# Patient Record
Sex: MALE | Race: BLACK OR AFRICAN AMERICAN | NOT HISPANIC OR LATINO | Employment: OTHER | URBAN - METROPOLITAN AREA
[De-identification: names, ages, dates, MRNs, and addresses within clinical notes are randomized per-mention and may not be internally consistent; named-entity substitution may affect disease eponyms.]

---

## 2020-01-13 ENCOUNTER — OFFICE VISIT (OUTPATIENT)
Dept: UROLOGY | Facility: CLINIC | Age: 65
End: 2020-01-13
Payer: MEDICAID

## 2020-01-13 VITALS
HEART RATE: 84 BPM | SYSTOLIC BLOOD PRESSURE: 123 MMHG | DIASTOLIC BLOOD PRESSURE: 80 MMHG | HEIGHT: 70 IN | WEIGHT: 185 LBS | BODY MASS INDEX: 26.48 KG/M2 | RESPIRATION RATE: 12 BRPM

## 2020-01-13 DIAGNOSIS — C61 PROSTATE CANCER METASTATIC TO INTRAPELVIC LYMPH NODE: Primary | ICD-10-CM

## 2020-01-13 DIAGNOSIS — C77.5 PROSTATE CANCER METASTATIC TO INTRAPELVIC LYMPH NODE: Primary | ICD-10-CM

## 2020-01-13 LAB
BILIRUB UR QL STRIP: NEGATIVE
GLUCOSE UR QL STRIP: POSITIVE
KETONES UR QL STRIP: NEGATIVE
LEUKOCYTE ESTERASE UR QL STRIP: NEGATIVE
PH, POC UA: 5
POC AMORP, URINE: ABNORMAL
POC BACTI, URINE: ABNORMAL
POC BLOOD, URINE: POSITIVE
POC CASTS, URINE: ABNORMAL
POC CRYST, URINE: ABNORMAL
POC EPITH, URINE: ABNORMAL
POC HCG, URINE: ABNORMAL
POC HYALIN, URINE: ABNORMAL
POC MUCUS, URINE: ABNORMAL
POC NITRATES, URINE: NEGATIVE
POC OTHER, URINE: ABNORMAL
POC RBC, URINE: ABNORMAL HPF
POC WBC, URINE: ABNORMAL HPF
PROT UR QL STRIP: NEGATIVE
PSA, DIAGNOSTIC: < 0.01 NG/ML (ref 0.1–4)
SP GR UR STRIP: 1.02 (ref 1–1.03)
TESTOST SERPL-MCNC: < 8 NG/DL (ref 95–948)
UROBILINOGEN UR STRIP-ACNC: 0.2 (ref 0.3–2.2)

## 2020-01-13 PROCEDURE — 99213 OFFICE O/P EST LOW 20 MIN: CPT | Mod: 25,S$GLB,, | Performed by: SPECIALIST

## 2020-01-13 PROCEDURE — 96402 PR CHEMOTHER HORMON ANTINEOPL SUB-Q/IM: ICD-10-PCS | Mod: S$GLB,,, | Performed by: SPECIALIST

## 2020-01-13 PROCEDURE — 99213 PR OFFICE/OUTPT VISIT, EST, LEVL III, 20-29 MIN: ICD-10-PCS | Mod: 25,S$GLB,, | Performed by: SPECIALIST

## 2020-01-13 PROCEDURE — 96402 CHEMO HORMON ANTINEOPL SQ/IM: CPT | Mod: S$GLB,,, | Performed by: SPECIALIST

## 2020-01-13 RX ORDER — METFORMIN HYDROCHLORIDE 1000 MG/1
TABLET ORAL
COMMUNITY
Start: 2019-10-15

## 2020-01-13 RX ORDER — NAPROXEN SODIUM 220 MG/1
81 TABLET, FILM COATED ORAL DAILY
COMMUNITY

## 2020-01-13 NOTE — PROGRESS NOTES
Subjective:       Patient ID: Pietro Ashley is a 64 y.o. male.    Chief Complaint: Prostate Cancer (3mth RS/ eligard due)      HPI:  64-year-old  man who was diagnosed with high risk prostate cancer [place].  When he presented to me we talked about treatment alternatives.  Given the high-risk nature of his disease knew he was going to require multimodality therapy.    He underwent robotic prostatectomy January of 2019 his almost 1 year other surgery.  He had a lot of pathological features with 2 Lone lymph nodes that were positive.  He was started on Casodex and Lupron therapy.  He is now on Lupron therapy every 3 months.  He has missed his dose from December of 2019.    He is on calcium and vitamin-D supplementations.  He has not had a DEXA scan which we plan to obtain through his primary care physician on the 1 year anniversary of him being on androgen deprivation treatment.    He still has some mild stress incontinence worsened by activity and straining.  He uses his vacuum device for erections for penile rehabilitation.    His urine today shows some microscopic hematuria.  This 1st time with the same blood in his urine.  We will be monitoring that closely.    Past Medical History:   Past Medical History:   Diagnosis Date    Diabetes mellitus     Elevated PSA     Prostate cancer        Past Surgical Historical:   Past Surgical History:   Procedure Laterality Date    KNEE SURGERY      left knee/pins placed    PROSTATE SURGERY      TONSILLECTOMY, ADENOIDECTOMY          Medications:   Medication List with Changes/Refills   Current Medications    ASPIRIN 81 MG CHEW    Take 81 mg by mouth once daily.    LEUPROLIDE, 6 MONTH, (ELIGARD) 45 MG INJECTION    Inject 45 mg into the skin every 6 (six) months.    METFORMIN (GLUCOPHAGE) 1000 MG TABLET            Past Social History:   Social History     Socioeconomic History    Marital status:      Spouse name: Not on  file    Number of children: Not on file    Years of education: Not on file    Highest education level: Not on file   Occupational History    Not on file   Social Needs    Financial resource strain: Not on file    Food insecurity:     Worry: Not on file     Inability: Not on file    Transportation needs:     Medical: Not on file     Non-medical: Not on file   Tobacco Use    Smoking status: Never Smoker    Smokeless tobacco: Never Used   Substance and Sexual Activity    Alcohol use: Yes     Frequency: Monthly or less    Drug use: Never    Sexual activity: Not Currently   Lifestyle    Physical activity:     Days per week: Not on file     Minutes per session: Not on file    Stress: Not on file   Relationships    Social connections:     Talks on phone: Not on file     Gets together: Not on file     Attends Sabian[employer] service: Not on file     Active member of club or organization: Not on file     Attends meetings of clubs or organizations: Not on file     Relationship status: Not on file   Other Topics Concern    Not on file   Social History Narrative    Not on file       Allergies:   Review of patient's allergies indicates:   Allergen Reactions    Glyburide Rash     Pt was on this med for a few days before rash appeared.        Family History: History reviewed. No pertinent family history.     Review of Systems:   systems reviewed and notable for metastatic prostate cancer  All other systems were reviewed Neg except as stated in the HPI    Physical Exam:  AGeneral: A&Ox3. No apparent distress. No deformities.  Neck: No masses. Normal thyroid.  Lungs: normal inspiration. No use of accessory muscles.  Heart: normal pulse. No arrhythmias.  Abdomen: Soft. NT. ND. No masses. No hernias. No hepatosplenomegaly.  Lymphatic: Neck and groin nodes negative.  Skin: The skin is warm and dry. No jaundice.  Neurology: Cranial nerves 2-12 crossly intact, no focal weaknesses, no sensation deficits, no motor  deficits  Ext: No clubbing, cyanosis or edema.  :  Deferred      Assessment/Plan:       64-year-old man with metastatic prostate cancer who is here today for follow-up.    1.  We shall administer 45 mg of Eligard today.  Patient will return to see us in 6 months.  2.  PSA and testosterone have been obtained and sent for today will give patient a call with the results.  3.  Microscopic hematuria has been noted today repeat a urinalysis in 6 months and see if he persist    Problem List Items Addressed This Visit     None      Visit Diagnoses     Prostate cancer metastatic to intrapelvic lymph node    -  Primary    Relevant Orders    POCT Urinalysis (w/Micro Option)    Prostate Specific Antigen, Diagnostic    Testosterone

## 2020-01-14 ENCOUNTER — DOCUMENTATION ONLY (OUTPATIENT)
Dept: UROLOGY | Facility: CLINIC | Age: 65
End: 2020-01-14

## 2020-01-15 ENCOUNTER — TELEPHONE (OUTPATIENT)
Dept: UROLOGY | Facility: CLINIC | Age: 65
End: 2020-01-15

## 2020-01-15 NOTE — TELEPHONE ENCOUNTER
----- Message from Isiah Jansen MD sent at 1/14/2020 10:06 PM CST -----  Please call and inform patient about this result.  His PSA is undetectable.  I think we will just give him a recent depot of Eligard for 6 months.  Plan will be to consider moving to intermittent androgen deprivation treatment.

## 2020-01-15 NOTE — PROGRESS NOTES
Please call and inform patient about this result.  His PSA is undetectable.  I think we will just give him a recent depot of Eligard for 6 months.  Plan will be to consider moving to intermittent androgen deprivation treatment.

## 2020-02-19 ENCOUNTER — TELEPHONE (OUTPATIENT)
Dept: UROLOGY | Facility: CLINIC | Age: 65
End: 2020-02-19

## 2020-02-19 NOTE — TELEPHONE ENCOUNTER
----- Message from Lang Hilton sent at 2/19/2020  1:14 PM CST -----  Contact: Pt  Please call Pietro to discuss a reference for another men's clinic [phone].

## 2020-02-19 NOTE — TELEPHONE ENCOUNTER
Pt wanted to get testosterone inj's, I advised why he should not. He then asked about otc meds to increase testosterone, I again explained why he should not increase his levels. Eventually I understood pt's real complaint is ED. Will let let dionna know sildenafil not working.

## 2020-03-13 ENCOUNTER — OFFICE VISIT (OUTPATIENT)
Dept: UROLOGY | Facility: CLINIC | Age: 65
End: 2020-03-13
Payer: MEDICAID

## 2020-03-13 VITALS
BODY MASS INDEX: 26.48 KG/M2 | WEIGHT: 185 LBS | SYSTOLIC BLOOD PRESSURE: 108 MMHG | RESPIRATION RATE: 19 BRPM | HEIGHT: 70 IN | DIASTOLIC BLOOD PRESSURE: 72 MMHG | HEART RATE: 83 BPM

## 2020-03-13 DIAGNOSIS — N52.9 ERECTILE DYSFUNCTION, UNSPECIFIED ERECTILE DYSFUNCTION TYPE: ICD-10-CM

## 2020-03-13 DIAGNOSIS — C61 PROSTATE CANCER: Primary | ICD-10-CM

## 2020-03-13 PROCEDURE — 99213 OFFICE O/P EST LOW 20 MIN: CPT | Mod: S$GLB,,, | Performed by: NURSE PRACTITIONER

## 2020-03-13 PROCEDURE — 99213 PR OFFICE/OUTPT VISIT, EST, LEVL III, 20-29 MIN: ICD-10-PCS | Mod: S$GLB,,, | Performed by: NURSE PRACTITIONER

## 2020-03-13 NOTE — PROGRESS NOTES
Subjective:       Patient ID: Pietro Ashley is a 65 y.o. male.    Chief Complaint: Erectile Dysfunction (Would like to possibly get the penile implant)      HPI: 65-year-old male, patient Dr. Jansen, presents with a complaint erectile dysfunction.  Patient has history of prostate cancer.  Patient had a radical prostatectomy on 01/28/2019.  Patient had 2 lymph nodes that were positive for metastatic prostate cancer.  Patient was started on Lupron.  Next injection scheduled for July 2020.    Patient complaint erectile dysfunction.  Patient has tried both Viagra and Cialis without relief.  Patient states he has a good energy level.  Patient also states he has a strong sexual desire.  Patient states that the inability to get an erection has caused him a great deal of stress.  Patient expresses interest in alternative treatments.    Patient denies pain or burning urination.  Denies difficulty voiding.  Denies weight loss.  Denies fever.  Denies blood in his urine.  No other urinary complaints.  All the problems appear stable at this time.       Past Medical History:   Past Medical History:   Diagnosis Date    Diabetes mellitus     Elevated PSA     Prostate cancer        Past Surgical Historical:   Past Surgical History:   Procedure Laterality Date    KNEE SURGERY      left knee/pins placed    PROSTATE SURGERY      TONSILLECTOMY, ADENOIDECTOMY          Medications:   Medication List with Changes/Refills   Current Medications    ASPIRIN 81 MG CHEW    Take 81 mg by mouth once daily.    LEUPROLIDE, 6 MONTH, (ELIGARD) 45 MG INJECTION    Inject 45 mg into the skin every 6 (six) months.    METFORMIN (GLUCOPHAGE) 1000 MG TABLET            Past Social History:   Social History     Socioeconomic History    Marital status:      Spouse name: Not on file    Number of children: Not on file    Years of education: Not on file    Highest education level: Not on file   Occupational History    Not on file   Social Needs     Financial resource strain: Not on file    Food insecurity:     Worry: Not on file     Inability: Not on file    Transportation needs:     Medical: Not on file     Non-medical: Not on file   Tobacco Use    Smoking status: Never Smoker    Smokeless tobacco: Never Used   Substance and Sexual Activity    Alcohol use: Yes     Frequency: Monthly or less    Drug use: Never    Sexual activity: Not Currently   Lifestyle    Physical activity:     Days per week: Not on file     Minutes per session: Not on file    Stress: Not on file   Relationships    Social connections:     Talks on phone: Not on file     Gets together: Not on file     Attends [employer] service: Not on file     Active member of club or organization: Not on file     Attends meetings of clubs or organizations: Not on file     Relationship status: Not on file   Other Topics Concern    Not on file   Social History Narrative    Not on file       Allergies:   Review of patient's allergies indicates:   Allergen Reactions    Glyburide Rash     Pt was on this med for a few days before rash appeared.        Family History: History reviewed. No pertinent family history.     Review of Systems:  Review of Systems   Constitutional: Negative for activity change and appetite change.   HENT: Negative for congestion and dental problem.    Respiratory: Negative for chest tightness and shortness of breath.    Cardiovascular: Negative for chest pain.   Gastrointestinal: Negative for abdominal distention and abdominal pain.   Genitourinary: Negative for decreased urine volume, difficulty urinating, discharge, dysuria, enuresis, flank pain, frequency, genital sores, hematuria, penile pain, penile swelling, scrotal swelling, testicular pain and urgency.   Musculoskeletal: Negative for back pain and neck pain.   Neurological: Negative for dizziness.   Hematological: Negative for adenopathy.   Psychiatric/Behavioral: Negative for agitation, behavioral problems and  confusion.       Physical Exam:  Physical Exam   Nursing note and vitals reviewed.  Constitutional: He is oriented to person, place, and time. He appears well-developed and well-nourished.   HENT:   Head: Normocephalic.   Cardiovascular: Normal rate, regular rhythm and normal heart sounds.    Pulmonary/Chest: Effort normal and breath sounds normal.   Abdominal: Soft. Bowel sounds are normal.   Neurological: He is alert and oriented to person, place, and time.   Skin: Skin is warm and dry.         Assessment/Plan:   1.  Erectile dysfunction:  Discussed with the patient penile injections and penile prosthesis.  Patient provided literature on both penile injections and penile prosthesis.  Patient will review that information and notify us if he would like to proceed with either 1 of those options.  l am not going to check the patient's testosterone, regardless if his testosterone is low he is not a candidate for testosterone treatment secondary to his history of prostate cancer.    2.  Prostate cancer:  Patient scheduled for Lupron injection July 2020.  Patient instructed keep that appointment is scheduled.    The patient will notify us if he wants to discuss alternate treatments for erectile dysfunction.  Otherwise patient will keep his appointment in July.  Follow-up as needed.  Problem List Items Addressed This Visit     None      Visit Diagnoses     Prostate cancer    -  Primary    Erectile dysfunction, unspecified erectile dysfunction type

## 2020-03-19 ENCOUNTER — TELEPHONE (OUTPATIENT)
Dept: UROLOGY | Facility: CLINIC | Age: 65
End: 2020-03-19

## 2020-03-19 NOTE — TELEPHONE ENCOUNTER
----- Message from Vicenta Lebron sent at 3/19/2020  9:41 AM CDT -----  Contact: pt   Patient is confused on if he need to get an injection or if he need a RX please call patient to discuss information. [phone]        Thanks   Vicenta Lebron

## 2020-03-19 NOTE — TELEPHONE ENCOUNTER
----- Message from Lisset Hicks sent at 3/19/2020  9:12 AM CDT -----  Contact: pt  Pt is requesting a call back from the nurse in regards to pt stated that the billing Company needs a CPT code to give the pt a price about self injection medicine.pt also needs pump number as well to get a price. Please call back at [phone] .[employer] [phone] Pt stated he will like to try to get the price on both.

## 2020-03-19 NOTE — TELEPHONE ENCOUNTER
Pt would like to try trimix prior to destini IPP sx. I advised I would check with provider as to sending out script or make appt.

## 2020-03-19 NOTE — TELEPHONE ENCOUNTER
Bring him to see me so we can talked about Tri Mix expectations in training him to inject himself.  We set up an appointment for him let me know so we can write a Tri Mix prescription that he can  before coming to the clinic

## 2020-03-30 ENCOUNTER — OFFICE VISIT (OUTPATIENT)
Dept: UROLOGY | Facility: CLINIC | Age: 65
End: 2020-03-30
Payer: MEDICAID

## 2020-03-30 VITALS
HEART RATE: 82 BPM | HEIGHT: 70 IN | RESPIRATION RATE: 19 BRPM | DIASTOLIC BLOOD PRESSURE: 84 MMHG | WEIGHT: 185 LBS | SYSTOLIC BLOOD PRESSURE: 126 MMHG | BODY MASS INDEX: 26.48 KG/M2

## 2020-03-30 DIAGNOSIS — N52.9 ERECTILE DYSFUNCTION, UNSPECIFIED ERECTILE DYSFUNCTION TYPE: Primary | ICD-10-CM

## 2020-03-30 DIAGNOSIS — C77.5 PROSTATE CANCER METASTATIC TO INTRAPELVIC LYMPH NODE: ICD-10-CM

## 2020-03-30 DIAGNOSIS — C61 PROSTATE CANCER METASTATIC TO INTRAPELVIC LYMPH NODE: ICD-10-CM

## 2020-03-30 PROCEDURE — 99213 PR OFFICE/OUTPT VISIT, EST, LEVL III, 20-29 MIN: ICD-10-PCS | Mod: S$GLB,,, | Performed by: SPECIALIST

## 2020-03-30 PROCEDURE — 99213 OFFICE O/P EST LOW 20 MIN: CPT | Mod: S$GLB,,, | Performed by: SPECIALIST

## 2020-03-30 NOTE — PROGRESS NOTES
Subjective:       Patient ID: Pietro Ashley is a 65 y.o. male.    Chief Complaint: Follow-up (Trimix injection)      HPI: 65-year-old male, presents with a complaint erectile dysfunction.  He is here today for Tri Mix injection trial.    Patient has history of prostate cancer.  Patient had a radical prostatectomy on 01/28/2019. Patient had 2 lymph nodes that were positive for metastatic prostate cancer.  Patient was started on Lupron.  Next injection scheduled for July 2020.  Recent PSA from January 2020 was < 0.01 ng/mL (undetectable).  Testosterone was castrate.     Patient complaint erectile dysfunction.  Patient has tried both Viagra and Cialis without relief.  Patient states he has a good energy level.  Patient also states he has a strong sexual desire.  Patient states that the inability to get an erection has caused him a great deal of stress.  We did make some recommendations to proceed with Tri Mix intra corpora injection trials.    Past Medical History:   Past Medical History:   Diagnosis Date    Diabetes mellitus     Elevated PSA     Prostate cancer        Past Surgical Historical:   Past Surgical History:   Procedure Laterality Date    KNEE SURGERY      left knee/pins placed    PROSTATE SURGERY      TONSILLECTOMY, ADENOIDECTOMY          Medications:   Medication List with Changes/Refills   Current Medications    ASPIRIN 81 MG CHEW    Take 81 mg by mouth once daily.    LEUPROLIDE, 6 MONTH, (ELIGARD) 45 MG INJECTION    Inject 45 mg into the skin every 6 (six) months.    METFORMIN (GLUCOPHAGE) 1000 MG TABLET            Past Social History:   Social History     Socioeconomic History    Marital status:      Spouse name: Not on file    Number of children: Not on file    Years of education: Not on file    Highest education level: Not on file   Occupational History    Not on file   Social Needs    Financial resource strain: Not on file    Food insecurity:     Worry: Not on file     Inability:  Not on file    Transportation needs:     Medical: Not on file     Non-medical: Not on file   Tobacco Use    Smoking status: Never Smoker    Smokeless tobacco: Never Used   Substance and Sexual Activity    Alcohol use: Yes     Frequency: Monthly or less    Drug use: Never    Sexual activity: Not Currently   Lifestyle    Physical activity:     Days per week: Not on file     Minutes per session: Not on file    Stress: Not on file   Relationships    Social connections:     Talks on phone: Not on file     Gets together: Not on file     Attends [employer] service: Not on file     Active member of club or organization: Not on file     Attends meetings of clubs or organizations: Not on file     Relationship status: Not on file   Other Topics Concern    Not on file   Social History Narrative    Not on file       Allergies:   Review of patient's allergies indicates:   Allergen Reactions    Glyburide Rash     Pt was on this med for a few days before rash appeared.        Family History: History reviewed. No pertinent family history.     Review of Systems:   systems reviewed and notable for erectile dysfunction as well as prostate cancer metastases to intrapelvic lymph nodes  All other systems were reviewed Neg except as stated in the HPI    Physical Exam:  General: A&Ox3. No apparent distress. No deformities.  Neck: No masses. Normal thyroid.  Lungs: normal inspiration. No use of accessory muscles.  Heart: normal pulse. No arrhythmias.  Abdomen: Soft. NT. ND. No masses. No hernias. No hepatosplenomegaly.  Lymphatic: Neck and groin nodes negative.  Skin: The skin is warm and dry. No jaundice.  Neurology: Cranial nerves 2-12 crossly intact, no focal weaknesses, no sensation deficits, no motor deficits  Ext: No clubbing, cyanosis or edema.  : External genitalia normal. Uncircumcised. No lesions. Meatus normal size and location.  Scrotum is normal.    Patient was late on examination table.  Penile injection was  demonstrated.  We injected 0.1 cc with Tri Mix 01/30/2020 into the left corpora at the base of the penis.  We checked on patient 15 min later and he had a full phallus but not adequate for sexual penetration.    Assessment/Plan:       65-year-old man with prostate cancer status post prostatectomy who was reported to have intrapelvic lymph node disease is currently on Lupron therapy presents for management of erectile dysfunction, with escalation in therapy after he failed oral medications.     1. We would recommend for him to inject 0.25 cc of Tri Mix 01/30/2020 intracorporeally once a week.  Patient was given instructions today on how to administer this medication.  2.  Patient's PSA continues to be undetectable.  His next Eligard is in July of 2020.  At that time we should make a decision if we need to the continue him on continuous Lupron therapy or switch him to intermittent Lupron therapy since his PSA has responded well.  3.  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

## 2020-05-19 ENCOUNTER — TELEPHONE (OUTPATIENT)
Dept: UROLOGY | Facility: CLINIC | Age: 65
End: 2020-05-19

## 2020-05-19 NOTE — TELEPHONE ENCOUNTER
Pt contacted clinic in regards to Rx refill. Pt did not know the name of the pharmacy, nurse advised pt that he would need to call back with the name of the pharmacy for his trimix injection. Pt verbalized understanding. NB

## 2020-05-19 NOTE — TELEPHONE ENCOUNTER
Attempted to contact pt regarding rx refill. No answer, left vm. BJP    ----- Message from Angie Santo sent at 5/19/2020  1:54 PM CDT -----  Contact: pt  Need another rill on med please call back [phone]

## 2020-06-02 ENCOUNTER — TELEPHONE (OUTPATIENT)
Dept: UROLOGY | Facility: CLINIC | Age: 65
End: 2020-06-02

## 2020-07-13 ENCOUNTER — OFFICE VISIT (OUTPATIENT)
Dept: UROLOGY | Facility: CLINIC | Age: 65
End: 2020-07-13
Payer: MEDICAID

## 2020-07-13 VITALS
HEIGHT: 70 IN | WEIGHT: 197 LBS | SYSTOLIC BLOOD PRESSURE: 108 MMHG | RESPIRATION RATE: 18 BRPM | DIASTOLIC BLOOD PRESSURE: 68 MMHG | HEART RATE: 83 BPM | BODY MASS INDEX: 28.2 KG/M2

## 2020-07-13 DIAGNOSIS — C77.5 PROSTATE CANCER METASTATIC TO INTRAPELVIC LYMPH NODE: Primary | ICD-10-CM

## 2020-07-13 DIAGNOSIS — C61 PROSTATE CANCER: ICD-10-CM

## 2020-07-13 DIAGNOSIS — C61 PROSTATE CANCER METASTATIC TO INTRAPELVIC LYMPH NODE: Primary | ICD-10-CM

## 2020-07-13 LAB
PSA, DIAGNOSTIC: <0.014 NG/ML (ref 0–4)
TESTOST SERPL-MCNC: <2.5 NG/DL (ref 193–740)

## 2020-07-13 PROCEDURE — 99213 OFFICE O/P EST LOW 20 MIN: CPT | Mod: S$GLB,,, | Performed by: SPECIALIST

## 2020-07-13 PROCEDURE — 99213 PR OFFICE/OUTPT VISIT, EST, LEVL III, 20-29 MIN: ICD-10-PCS | Mod: S$GLB,,, | Performed by: SPECIALIST

## 2020-07-13 NOTE — PROGRESS NOTES
Subjective:       Patient ID: Pietro Ashley is a 65 y.o. male.    Chief Complaint: 6 mth f/u with Lupron inj.      HPI:  65-year-old  male presented high risk prostate cancer underwent a robotic radical prostatectomy with bilateral pelvic lymph nodes dissection January 28, 2019.  Final pathology was concerning for adverse pathological features.  He also had to lymph nodes that were positive for metastatic disease.  He has been on androgen deprivation treatment.  Initially started on combined treatment now he is just on single Lupron treatment.  His last 6 month Depo Lupron was January of 2020.  That time we obtain a PSA and as well as serum testosterone levels.  PSA was undetectable and T levels were castrate.    He has erectile dysfunction he had tried sildenafil citrate and other PD 5 inhibitors without success.  He is now on intracavernosal injection treatments with Tri Mix in the since to be helping him.  He administers 0.25 cc per session.    He continues to be on calcium and vitamin D supplementation.  He denies any bone pain today.  He denies any constitutional symptoms such as night sweats malaise fatigue and weight loss.    Past Medical History:   Past Medical History:   Diagnosis Date    Diabetes mellitus     ED (erectile dysfunction)     Elevated PSA     Prostate cancer        Past Surgical Historical:   Past Surgical History:   Procedure Laterality Date    KNEE SURGERY      left knee/pins placed    PROSTATE SURGERY      TONSILLECTOMY, ADENOIDECTOMY          Medications:   Medication List with Changes/Refills   Current Medications    ASPIRIN 81 MG CHEW    Take 81 mg by mouth once daily.    LEUPROLIDE, 6 MONTH, (ELIGARD) 45 MG INJECTION    Inject 45 mg into the skin every 6 (six) months.    METFORMIN (GLUCOPHAGE) 1000 MG TABLET            Past Social History:   Social History     Socioeconomic History    Marital status:      Spouse name: Not on file    Number of children: Not  on file    Years of education: Not on file    Highest education level: Not on file   Occupational History    Not on file   Social Needs    Financial resource strain: Not on file    Food insecurity     Worry: Not on file     Inability: Not on file    Transportation needs     Medical: Not on file     Non-medical: Not on file   Tobacco Use    Smoking status: Never Smoker    Smokeless tobacco: Never Used   Substance and Sexual Activity    Alcohol use: Yes     Frequency: Monthly or less    Drug use: Never    Sexual activity: Not Currently   Lifestyle    Physical activity     Days per week: Not on file     Minutes per session: Not on file    Stress: Not on file   Relationships    Social connections     Talks on phone: Not on file     Gets together: Not on file     Attends L[employer] service: Not on file     Active member of club or organization: Not on file     Attends meetings of clubs or organizations: Not on file     Relationship status: Not on file   Other Topics Concern    Not on file   Social History Narrative    Not on file       Allergies:   Review of patient's allergies indicates:   Allergen Reactions    Glyburide Rash     Pt was on this med for a few days before rash appeared.        Family History: History reviewed. No pertinent family history.     Review of Systems:   systems reviewed and notable for lymph no metastatic prostate cancer  All other systems were reviewed Neg except as stated in the HPI    Physical Exam:  General: A&Ox3. No apparent distress. No deformities.  Neck: No masses. Normal thyroid.  Lungs: normal inspiration. No use of accessory muscles.  Heart: normal pulse. No arrhythmias.  Abdomen: Soft. NT. ND. No masses. No hernias. No hepatosplenomegaly.  Lymphatic: Neck and groin nodes negative.  Skin: The skin is warm and dry. No jaundice.  Neurology: Cranial nerves 2-12 crossly intact, no focal weaknesses, no sensation deficits, no motor deficits  Ext: No clubbing, cyanosis or  edema.  :  Deferred      Assessment/Plan:       65-year-old man with lymph node metastatic prostate cancer as well as erectile dysfunction was here today for follow-up.    1.  The plan will be to transition him into intermittent ADT.  ADT therapy has some systemic side effects.  And since his PSA in January of 2020 was undetectable and testosterone was castrate I am going to repeat a PSA and testosterone today and make a determination patient should be kept on the Lupron treatment.  2.  Because with switching him to potentially intermittent ADT treatments when changes appointments from 6 months to 3 months  3.  Continue using Tri Mix penile injections for erectile dysfunction.    Problem List Items Addressed This Visit     None      Visit Diagnoses     Prostate cancer metastatic to intrapelvic lymph node    -  Primary    Relevant Orders    Testosterone    Prostate cancer        Relevant Orders    Prostate Specific Antigen, Diagnostic

## 2020-07-14 ENCOUNTER — TELEPHONE (OUTPATIENT)
Dept: UROLOGY | Facility: CLINIC | Age: 65
End: 2020-07-14

## 2020-07-14 NOTE — TELEPHONE ENCOUNTER
Pt contacted and informed of PSA results. Pt verbalized understanding.     ----- Message from Isiah Jansen MD sent at 7/14/2020  3:23 PM CDT -----  His PSA continues to be undetectable.  Call and inform him of the results.  We shall maintain him on intermittent androgen deprivation treatments

## 2021-03-12 ENCOUNTER — OFFICE VISIT (OUTPATIENT)
Dept: UROLOGY | Facility: CLINIC | Age: 66
End: 2021-03-12
Payer: MEDICARE

## 2021-03-12 VITALS
SYSTOLIC BLOOD PRESSURE: 129 MMHG | DIASTOLIC BLOOD PRESSURE: 85 MMHG | BODY MASS INDEX: 26.83 KG/M2 | WEIGHT: 187 LBS | HEART RATE: 86 BPM

## 2021-03-12 DIAGNOSIS — N52.9 ERECTILE DYSFUNCTION, UNSPECIFIED ERECTILE DYSFUNCTION TYPE: ICD-10-CM

## 2021-03-12 DIAGNOSIS — C77.5 PROSTATE CANCER METASTATIC TO INTRAPELVIC LYMPH NODE: Primary | ICD-10-CM

## 2021-03-12 DIAGNOSIS — C61 PROSTATE CANCER METASTATIC TO INTRAPELVIC LYMPH NODE: Primary | ICD-10-CM

## 2021-03-12 LAB
PSA, DIAGNOSTIC: <0.014 NG/ML (ref 0–4)
TESTOST SERPL-MCNC: 36.2 NG/DL (ref 193–740)

## 2021-03-12 PROCEDURE — 3008F PR BODY MASS INDEX (BMI) DOCUMENTED: ICD-10-PCS | Mod: CPTII,S$GLB,, | Performed by: SPECIALIST

## 2021-03-12 PROCEDURE — 99213 OFFICE O/P EST LOW 20 MIN: CPT | Mod: S$GLB,,, | Performed by: SPECIALIST

## 2021-03-12 PROCEDURE — 3008F BODY MASS INDEX DOCD: CPT | Mod: CPTII,S$GLB,, | Performed by: SPECIALIST

## 2021-03-12 PROCEDURE — 1126F AMNT PAIN NOTED NONE PRSNT: CPT | Mod: S$GLB,,, | Performed by: SPECIALIST

## 2021-03-12 PROCEDURE — 1126F PR PAIN SEVERITY QUANTIFIED, NO PAIN PRESENT: ICD-10-PCS | Mod: S$GLB,,, | Performed by: SPECIALIST

## 2021-03-12 PROCEDURE — 99213 PR OFFICE/OUTPT VISIT, EST, LEVL III, 20-29 MIN: ICD-10-PCS | Mod: S$GLB,,, | Performed by: SPECIALIST

## 2021-03-12 RX ORDER — EMPAGLIFLOZIN 10 MG/1
10 TABLET, FILM COATED ORAL DAILY
COMMUNITY
Start: 2021-03-10

## 2021-03-15 ENCOUNTER — TELEPHONE (OUTPATIENT)
Dept: UROLOGY | Facility: CLINIC | Age: 66
End: 2021-03-15

## 2021-03-22 ENCOUNTER — TELEPHONE (OUTPATIENT)
Dept: UROLOGY | Facility: CLINIC | Age: 66
End: 2021-03-22

## 2021-03-23 ENCOUNTER — TELEPHONE (OUTPATIENT)
Dept: UROLOGY | Facility: CLINIC | Age: 66
End: 2021-03-23

## 2021-03-26 ENCOUNTER — TELEPHONE (OUTPATIENT)
Dept: UROLOGY | Facility: CLINIC | Age: 66
End: 2021-03-26

## 2021-03-28 DIAGNOSIS — N52.31 ERECTILE DYSFUNCTION AFTER RADICAL PROSTATECTOMY: Primary | ICD-10-CM

## 2021-03-30 ENCOUNTER — CLINICAL SUPPORT (OUTPATIENT)
Dept: UROLOGY | Facility: CLINIC | Age: 66
End: 2021-03-30
Payer: MEDICARE

## 2021-03-30 ENCOUNTER — TELEPHONE (OUTPATIENT)
Dept: UROLOGY | Facility: CLINIC | Age: 66
End: 2021-03-30

## 2021-03-30 DIAGNOSIS — N52.31 ERECTILE DYSFUNCTION AFTER RADICAL PROSTATECTOMY: Primary | ICD-10-CM

## 2021-03-30 RX ORDER — CALCIUM CITRATE/VITAMIN D3 200MG-6.25
TABLET ORAL
COMMUNITY
Start: 2021-03-11

## 2021-03-30 RX ORDER — DIPHENHYDRAMINE HCL 25 MG
TABLET ORAL
COMMUNITY
Start: 2021-03-11

## 2021-03-30 RX ORDER — LANCETS 33 GAUGE
EACH MISCELLANEOUS
COMMUNITY
Start: 2021-03-11

## 2021-04-01 ENCOUNTER — TELEPHONE (OUTPATIENT)
Dept: UROLOGY | Facility: CLINIC | Age: 66
End: 2021-04-01

## 2021-04-05 ENCOUNTER — TELEPHONE (OUTPATIENT)
Dept: UROLOGY | Facility: CLINIC | Age: 66
End: 2021-04-05

## 2021-04-05 LAB
ALBUMIN SERPL BCP-MCNC: 4.4 G/DL (ref 3.4–5)
ALBUMIN/GLOBULIN RATIO: 1.26 RATIO (ref 1.1–1.8)
ALP SERPL-CCNC: 111 U/L (ref 46–116)
ALT SERPL W P-5'-P-CCNC: 30 U/L (ref 12–78)
ANION GAP SERPL CALC-SCNC: 10 MMOL/L (ref 3–11)
AST SERPL-CCNC: 17 U/L (ref 15–37)
ATYPICAL LYMPHOCYTES: 2 % (ref 0–0)
BANDS: 2 % (ref 0–5)
BILIRUB SERPL-MCNC: 0.4 MG/DL (ref 0–1)
BUN SERPL-MCNC: 24 MG/DL (ref 7–18)
BUN/CREAT SERPL: 21.23 RATIO (ref 7–18)
CALCIUM BLD-MCNC: POSITIVE MG/DL
CALCIUM SERPL-MCNC: 9.2 MG/DL (ref 8.8–10.5)
CELLS COUNTED: 100
CHLORIDE SERPL-SCNC: 104 MMOL/L (ref 100–108)
CO2 SERPL-SCNC: 27 MMOL/L (ref 21–32)
COVID-19 AB, IGM: NEGATIVE
CREAT SERPL-MCNC: 1.13 MG/DL (ref 0.7–1.3)
EOSINOPHIL NFR BLD: 3 % (ref 1–3)
ERYTHROCYTE [DISTWIDTH] IN BLOOD BY AUTOMATED COUNT: 12.4 % (ref 12.5–18)
GFR ESTIMATION: > 60
GLOBULIN: 3.5 G/DL (ref 2.3–3.5)
GLUCOSE SERPL-MCNC: 177 MG/DL (ref 70–110)
HCT VFR BLD AUTO: 44.8 % (ref 42–52)
HGB BLD-MCNC: 15.2 G/DL (ref 14–18)
LYMPHOCYTES NFR BLD: 27 % (ref 25–40)
MCH RBC QN AUTO: 32.9 PG (ref 27–31.2)
MCHC RBC AUTO-ENTMCNC: 33.9 G/DL (ref 31.8–35.4)
MCV RBC AUTO: 97 FL (ref 80–97)
MONOCYTES NFR BLD: 5 % (ref 1–15)
NEUTROPHILS # BLD AUTO: 3.7 10*3/UL (ref 1.8–7.7)
NEUTROPHILS NFR BLD: 61 % (ref 37–80)
NUCLEATED RED BLOOD CELLS: 0 %
PLATELETS: 251 10*3/UL (ref 142–424)
POTASSIUM SERPL-SCNC: 4.6 MMOL/L (ref 3.6–5.2)
PROT SERPL-MCNC: 7.9 G/DL (ref 6.4–8.2)
RBC # BLD AUTO: 4.62 10*6/UL (ref 4.7–6.1)
RBC MORPH BLD: ABNORMAL
SMALL PLATELETS BLD QL SMEAR: ADEQUATE
SODIUM BLD-SCNC: 141 MMOL/L (ref 135–145)
WBC # BLD: 5.8 10*3/UL (ref 4.6–10.2)

## 2021-04-07 ENCOUNTER — OUTSIDE PLACE OF SERVICE (OUTPATIENT)
Dept: UROLOGY | Facility: CLINIC | Age: 66
End: 2021-04-07

## 2021-04-07 LAB
GLUCOSE SERPL-MCNC: 139 MG/DL (ref 70–105)
GLUCOSE SERPL-MCNC: 163 MG/DL (ref 70–105)
GLUCOSE SERPL-MCNC: 249 MG/DL (ref 70–105)

## 2021-04-07 PROCEDURE — 54405 PR INSERT,INFLATABLE PENILE PROSTHESIS: ICD-10-PCS | Mod: ,,, | Performed by: SPECIALIST

## 2021-04-07 PROCEDURE — 54405 INSERT MULTI-COMP PENIS PROS: CPT | Mod: ,,, | Performed by: SPECIALIST

## 2021-04-08 LAB
ANION GAP SERPL CALC-SCNC: 5 MMOL/L (ref 3–11)
BUN SERPL-MCNC: 16 MG/DL (ref 7–18)
BUN/CREAT SERPL: 16.49 RATIO (ref 7–18)
CALCIUM SERPL-MCNC: 8.2 MG/DL (ref 8.8–10.5)
CHLORIDE SERPL-SCNC: 103 MMOL/L (ref 100–108)
CO2 SERPL-SCNC: 26 MMOL/L (ref 21–32)
CREAT SERPL-MCNC: 0.97 MG/DL (ref 0.7–1.3)
ERYTHROCYTE [DISTWIDTH] IN BLOOD BY AUTOMATED COUNT: 12 % (ref 12.5–18)
GFR ESTIMATION: > 60
GLUCOSE SERPL-MCNC: 159 MG/DL (ref 70–110)
HCT VFR BLD AUTO: 36.3 % (ref 42–52)
HGB BLD-MCNC: 12.3 G/DL (ref 14–18)
MCH RBC QN AUTO: 33 PG (ref 27–31.2)
MCHC RBC AUTO-ENTMCNC: 33.9 G/DL (ref 31.8–35.4)
MCV RBC AUTO: 97.3 FL (ref 80–97)
NUCLEATED RED BLOOD CELLS: 0 %
PLATELETS: 241 10*3/UL (ref 142–424)
POTASSIUM SERPL-SCNC: 4.3 MMOL/L (ref 3.6–5.2)
RBC # BLD AUTO: 3.73 10*6/UL (ref 4.7–6.1)
SODIUM BLD-SCNC: 134 MMOL/L (ref 135–145)
WBC # BLD: 7.2 10*3/UL (ref 4.6–10.2)

## 2021-04-12 ENCOUNTER — OFFICE VISIT (OUTPATIENT)
Dept: UROLOGY | Facility: CLINIC | Age: 66
End: 2021-04-12
Payer: MEDICARE

## 2021-04-12 VITALS — HEART RATE: 95 BPM | SYSTOLIC BLOOD PRESSURE: 114 MMHG | DIASTOLIC BLOOD PRESSURE: 79 MMHG

## 2021-04-12 DIAGNOSIS — Z51.89 VISIT FOR WOUND CHECK: Primary | ICD-10-CM

## 2021-04-12 PROCEDURE — 99499 NO LOS: ICD-10-PCS | Mod: S$GLB,,, | Performed by: SPECIALIST

## 2021-04-12 PROCEDURE — 1126F PR PAIN SEVERITY QUANTIFIED, NO PAIN PRESENT: ICD-10-PCS | Mod: S$GLB,,, | Performed by: SPECIALIST

## 2021-04-12 PROCEDURE — 1126F AMNT PAIN NOTED NONE PRSNT: CPT | Mod: S$GLB,,, | Performed by: SPECIALIST

## 2021-04-12 PROCEDURE — 99499 UNLISTED E&M SERVICE: CPT | Mod: S$GLB,,, | Performed by: SPECIALIST

## 2021-04-29 ENCOUNTER — OFFICE VISIT (OUTPATIENT)
Dept: UROLOGY | Facility: CLINIC | Age: 66
End: 2021-04-29
Payer: MEDICARE

## 2021-04-29 VITALS
SYSTOLIC BLOOD PRESSURE: 135 MMHG | HEIGHT: 70 IN | DIASTOLIC BLOOD PRESSURE: 76 MMHG | BODY MASS INDEX: 26.77 KG/M2 | WEIGHT: 187 LBS | HEART RATE: 92 BPM

## 2021-04-29 DIAGNOSIS — Z51.89 VISIT FOR WOUND CHECK: Primary | ICD-10-CM

## 2021-04-29 PROCEDURE — 3008F PR BODY MASS INDEX (BMI) DOCUMENTED: ICD-10-PCS | Mod: CPTII,S$GLB,, | Performed by: SPECIALIST

## 2021-04-29 PROCEDURE — 3008F BODY MASS INDEX DOCD: CPT | Mod: CPTII,S$GLB,, | Performed by: SPECIALIST

## 2021-04-29 PROCEDURE — 99499 UNLISTED E&M SERVICE: CPT | Mod: S$GLB,,, | Performed by: SPECIALIST

## 2021-04-29 PROCEDURE — 99499 NO LOS: ICD-10-PCS | Mod: S$GLB,,, | Performed by: SPECIALIST

## 2021-04-29 RX ORDER — CEPHALEXIN 500 MG/1
500 CAPSULE ORAL EVERY 12 HOURS
Qty: 28 CAPSULE | Refills: 0 | Status: SHIPPED | OUTPATIENT
Start: 2021-04-29

## 2021-04-29 RX ORDER — ROSUVASTATIN CALCIUM 20 MG/1
20 TABLET, COATED ORAL DAILY
COMMUNITY
Start: 2021-04-12

## 2021-04-29 RX ORDER — LISINOPRIL 5 MG/1
2.5 TABLET ORAL DAILY
COMMUNITY
Start: 2021-04-12

## 2021-04-29 RX ORDER — LEVOFLOXACIN 500 MG/1
500 TABLET, FILM COATED ORAL DAILY
Qty: 21 TABLET | Refills: 0 | Status: SHIPPED | OUTPATIENT
Start: 2021-04-29

## 2021-05-27 ENCOUNTER — OFFICE VISIT (OUTPATIENT)
Dept: UROLOGY | Facility: CLINIC | Age: 66
End: 2021-05-27
Payer: MEDICARE

## 2021-05-27 VITALS
SYSTOLIC BLOOD PRESSURE: 108 MMHG | BODY MASS INDEX: 26.77 KG/M2 | HEIGHT: 70 IN | HEART RATE: 100 BPM | DIASTOLIC BLOOD PRESSURE: 71 MMHG | WEIGHT: 187 LBS

## 2021-05-27 DIAGNOSIS — Z51.89 VISIT FOR WOUND CHECK: Primary | ICD-10-CM

## 2021-05-27 PROCEDURE — 99024 PR POST-OP FOLLOW-UP VISIT: ICD-10-PCS | Mod: S$GLB,,, | Performed by: SPECIALIST

## 2021-05-27 PROCEDURE — 3008F BODY MASS INDEX DOCD: CPT | Mod: CPTII,S$GLB,, | Performed by: SPECIALIST

## 2021-05-27 PROCEDURE — 3008F PR BODY MASS INDEX (BMI) DOCUMENTED: ICD-10-PCS | Mod: CPTII,S$GLB,, | Performed by: SPECIALIST

## 2021-05-27 PROCEDURE — 99024 POSTOP FOLLOW-UP VISIT: CPT | Mod: S$GLB,,, | Performed by: SPECIALIST

## 2021-10-13 ENCOUNTER — OFFICE VISIT (OUTPATIENT)
Dept: UROLOGY | Facility: CLINIC | Age: 66
End: 2021-10-13
Payer: MEDICARE

## 2021-10-13 VITALS — SYSTOLIC BLOOD PRESSURE: 121 MMHG | DIASTOLIC BLOOD PRESSURE: 72 MMHG | HEART RATE: 88 BPM

## 2021-10-13 DIAGNOSIS — N52.31 ERECTILE DYSFUNCTION AFTER RADICAL PROSTATECTOMY: ICD-10-CM

## 2021-10-13 DIAGNOSIS — C61 PROSTATE CANCER: Primary | ICD-10-CM

## 2021-10-13 DIAGNOSIS — Z90.79 S/P PROSTATECTOMY: ICD-10-CM

## 2021-10-13 PROCEDURE — 1160F RVW MEDS BY RX/DR IN RCRD: CPT | Mod: CPTII,S$GLB,, | Performed by: NURSE PRACTITIONER

## 2021-10-13 PROCEDURE — 3074F SYST BP LT 130 MM HG: CPT | Mod: CPTII,S$GLB,, | Performed by: NURSE PRACTITIONER

## 2021-10-13 PROCEDURE — 1159F MED LIST DOCD IN RCRD: CPT | Mod: CPTII,S$GLB,, | Performed by: NURSE PRACTITIONER

## 2021-10-13 PROCEDURE — 3078F DIAST BP <80 MM HG: CPT | Mod: CPTII,S$GLB,, | Performed by: NURSE PRACTITIONER

## 2021-10-13 PROCEDURE — 1160F PR REVIEW ALL MEDS BY PRESCRIBER/CLIN PHARMACIST DOCUMENTED: ICD-10-PCS | Mod: CPTII,S$GLB,, | Performed by: NURSE PRACTITIONER

## 2021-10-13 PROCEDURE — 99213 PR OFFICE/OUTPT VISIT, EST, LEVL III, 20-29 MIN: ICD-10-PCS | Mod: S$GLB,,, | Performed by: NURSE PRACTITIONER

## 2021-10-13 PROCEDURE — 36415 PR COLLECTION VENOUS BLOOD,VENIPUNCTURE: ICD-10-PCS | Mod: S$GLB,,, | Performed by: NURSE PRACTITIONER

## 2021-10-13 PROCEDURE — 3074F PR MOST RECENT SYSTOLIC BLOOD PRESSURE < 130 MM HG: ICD-10-PCS | Mod: CPTII,S$GLB,, | Performed by: NURSE PRACTITIONER

## 2021-10-13 PROCEDURE — 1159F PR MEDICATION LIST DOCUMENTED IN MEDICAL RECORD: ICD-10-PCS | Mod: CPTII,S$GLB,, | Performed by: NURSE PRACTITIONER

## 2021-10-13 PROCEDURE — 4010F PR ACE/ARB THEARPY RXD/TAKEN: ICD-10-PCS | Mod: CPTII,S$GLB,, | Performed by: NURSE PRACTITIONER

## 2021-10-13 PROCEDURE — 99213 OFFICE O/P EST LOW 20 MIN: CPT | Mod: S$GLB,,, | Performed by: NURSE PRACTITIONER

## 2021-10-13 PROCEDURE — 36415 COLL VENOUS BLD VENIPUNCTURE: CPT | Mod: S$GLB,,, | Performed by: NURSE PRACTITIONER

## 2021-10-13 PROCEDURE — 4010F ACE/ARB THERAPY RXD/TAKEN: CPT | Mod: CPTII,S$GLB,, | Performed by: NURSE PRACTITIONER

## 2021-10-13 PROCEDURE — 3078F PR MOST RECENT DIASTOLIC BLOOD PRESSURE < 80 MM HG: ICD-10-PCS | Mod: CPTII,S$GLB,, | Performed by: NURSE PRACTITIONER

## 2021-10-14 ENCOUNTER — TELEPHONE (OUTPATIENT)
Dept: UROLOGY | Facility: CLINIC | Age: 66
End: 2021-10-14

## 2021-10-14 LAB — PSA, DIAGNOSTIC: <0.014 NG/ML (ref 0–4)

## 2025-05-05 ENCOUNTER — HOSPITAL ENCOUNTER (OUTPATIENT)
Dept: RADIOLOGY | Facility: HOSPITAL | Age: 70
Discharge: HOME | End: 2025-05-05
Attending: NURSE PRACTITIONER
Payer: MEDICARE

## 2025-05-05 DIAGNOSIS — G89.29 CHRONIC RIGHT SHOULDER PAIN: ICD-10-CM

## 2025-05-05 DIAGNOSIS — M25.511 CHRONIC RIGHT SHOULDER PAIN: ICD-10-CM

## 2025-05-05 PROCEDURE — 73030 X-RAY EXAM OF SHOULDER: CPT | Mod: TC,RT
